# Patient Record
Sex: MALE | URBAN - METROPOLITAN AREA
[De-identification: names, ages, dates, MRNs, and addresses within clinical notes are randomized per-mention and may not be internally consistent; named-entity substitution may affect disease eponyms.]

---

## 2024-11-25 ENCOUNTER — APPOINTMENT (OUTPATIENT)
Dept: URBAN - METROPOLITAN AREA CLINIC 298 | Age: 39
Setting detail: DERMATOLOGY
End: 2024-12-01

## 2024-11-25 VITALS — HEIGHT: 71 IN | WEIGHT: 250 LBS | RESPIRATION RATE: 18 BRPM

## 2024-11-25 DIAGNOSIS — L72.0 EPIDERMAL CYST: ICD-10-CM

## 2024-11-25 PROCEDURE — OTHER MIPS QUALITY: OTHER

## 2024-11-25 PROCEDURE — 99202 OFFICE O/P NEW SF 15 MIN: CPT

## 2024-11-25 PROCEDURE — OTHER COUNSELING: OTHER

## 2024-11-25 PROCEDURE — OTHER DEFER: OTHER

## 2024-11-25 ASSESSMENT — LOCATION ZONE DERM: LOCATION ZONE: NECK

## 2024-11-25 ASSESSMENT — LOCATION DETAILED DESCRIPTION DERM
LOCATION DETAILED: LEFT CENTRAL LATERAL NECK
LOCATION DETAILED: LEFT SUPERIOR LATERAL NECK

## 2024-11-25 ASSESSMENT — LOCATION SIMPLE DESCRIPTION DERM: LOCATION SIMPLE: NECK

## 2024-12-23 ENCOUNTER — APPOINTMENT (OUTPATIENT)
Dept: URBAN - METROPOLITAN AREA CLINIC 298 | Age: 39
Setting detail: DERMATOLOGY
End: 2024-12-23

## 2024-12-23 VITALS — HEIGHT: 60 IN | WEIGHT: 250 LBS | RESPIRATION RATE: 18 BRPM

## 2024-12-23 DIAGNOSIS — L72.0 EPIDERMAL CYST: ICD-10-CM

## 2024-12-23 PROCEDURE — 11424 EXC H-F-NK-SP B9+MARG 3.1-4: CPT

## 2024-12-23 PROCEDURE — OTHER MIPS QUALITY: OTHER

## 2024-12-23 PROCEDURE — OTHER EXCISION: OTHER

## 2024-12-23 PROCEDURE — 12042 INTMD RPR N-HF/GENIT2.6-7.5: CPT

## 2024-12-23 PROCEDURE — OTHER COUNSELING: OTHER

## 2024-12-23 ASSESSMENT — LOCATION ZONE DERM: LOCATION ZONE: NECK

## 2024-12-23 ASSESSMENT — LOCATION DETAILED DESCRIPTION DERM: LOCATION DETAILED: LEFT SUPERIOR LATERAL NECK

## 2024-12-23 ASSESSMENT — LOCATION SIMPLE DESCRIPTION DERM: LOCATION SIMPLE: NECK

## 2024-12-23 NOTE — PROCEDURE: EXCISION

## 2025-01-03 ENCOUNTER — APPOINTMENT (OUTPATIENT)
Dept: URBAN - METROPOLITAN AREA CLINIC 298 | Age: 40
Setting detail: DERMATOLOGY
End: 2025-01-03

## 2025-01-03 DIAGNOSIS — Z48.02 ENCOUNTER FOR REMOVAL OF SUTURES: ICD-10-CM

## 2025-01-03 PROCEDURE — 99212 OFFICE O/P EST SF 10 MIN: CPT

## 2025-01-03 PROCEDURE — OTHER SUTURE REMOVAL (NO GLOBAL PERIOD): OTHER

## 2025-01-03 ASSESSMENT — LOCATION ZONE DERM: LOCATION ZONE: NECK

## 2025-01-03 ASSESSMENT — LOCATION DETAILED DESCRIPTION DERM: LOCATION DETAILED: LEFT SUPERIOR LATERAL NECK

## 2025-01-03 ASSESSMENT — LOCATION SIMPLE DESCRIPTION DERM: LOCATION SIMPLE: NECK

## 2025-01-03 NOTE — PROCEDURE: SUTURE REMOVAL (NO GLOBAL PERIOD)
Detail Level: Detailed
Add 1585x Cpt? (Do Not Bill If You Billed For The Procedure Placing The Sutures. This Is An Add-On Code That Must Be Billed With An E/M Visit Code): No
Suture Removal Completed By (Optional): Faith Ayala MA